# Patient Record
Sex: MALE | Race: WHITE | NOT HISPANIC OR LATINO | Employment: FULL TIME | URBAN - METROPOLITAN AREA
[De-identification: names, ages, dates, MRNs, and addresses within clinical notes are randomized per-mention and may not be internally consistent; named-entity substitution may affect disease eponyms.]

---

## 2019-07-14 ENCOUNTER — HOSPITAL ENCOUNTER (EMERGENCY)
Facility: HOSPITAL | Age: 39
Discharge: HOME | End: 2019-07-14
Attending: EMERGENCY MEDICINE | Admitting: EMERGENCY MEDICINE
Payer: COMMERCIAL

## 2019-07-14 ENCOUNTER — APPOINTMENT (EMERGENCY)
Dept: RADIOLOGY | Facility: HOSPITAL | Age: 39
End: 2019-07-14
Payer: COMMERCIAL

## 2019-07-14 VITALS
TEMPERATURE: 98.2 F | HEART RATE: 91 BPM | SYSTOLIC BLOOD PRESSURE: 119 MMHG | DIASTOLIC BLOOD PRESSURE: 54 MMHG | RESPIRATION RATE: 19 BRPM | OXYGEN SATURATION: 96 %

## 2019-07-14 DIAGNOSIS — S67.22XA: Primary | ICD-10-CM

## 2019-07-14 PROCEDURE — 99283 EMERGENCY DEPT VISIT LOW MDM: CPT

## 2019-07-14 PROCEDURE — 73140 X-RAY EXAM OF FINGER(S): CPT

## 2019-07-14 PROCEDURE — 99283 EMERGENCY DEPT VISIT LOW MDM: CPT | Performed by: EMERGENCY MEDICINE

## 2019-07-14 RX ORDER — LISINOPRIL 10 MG/1
10 TABLET ORAL DAILY
COMMUNITY
Start: 2018-09-04

## 2019-07-14 RX ORDER — BUPROPION HYDROCHLORIDE 150 MG/1
150 TABLET ORAL
COMMUNITY
Start: 2019-05-09 | End: 2020-05-08

## 2019-07-14 NOTE — ED PROVIDER NOTES
History  Chief Complaint   Patient presents with    Finger Laceration     to L thumb and injury to 4th L digit, onset today w/ a blade      43 yo male here for injury to left hand  Right hand dominant  It was briefly crushing by light force  Able to remove immediately  Small skin avulsion of left thumb  Crushing injury to left 4th digit  No numbness, tingling weakness  No other injuries reported  No prior injuries to this area  Does not take any anticoagulants or antiplatelets  He was worried about his wound because he is a diabetic  History provided by:  Patient   used: No    Finger Laceration   Location: left thumb  Length:  Skin avulsion  Depth:  Cutaneous  Quality: stellate    Bleeding: controlled    Time since incident:  3 hours  Laceration mechanism:  Metal edge  Pain details:     Quality:  Aching    Severity:  Mild    Timing:  Constant    Progression:  Unchanged  Foreign body present:  No foreign bodies  Relieved by:  Nothing  Worsened by:  Nothing  Ineffective treatments:  None tried  Tetanus status:  Up to date  Associated symptoms: no fever, no focal weakness, no numbness, no rash, no redness, no swelling and no streaking        Prior to Admission Medications   Prescriptions Last Dose Informant Patient Reported? Taking? SITagliptin-metFORMIN HCl ER (JANUMET XR) 100-1000 MG TB24   Yes Yes   Sig: TAKE 1 TABLET BY MOUTH EVERY DAY   Semaglutide (OZEMPIC) 0 25 or 0 5 MG/DOSE SOPN   Yes Yes   Sig: Inject 0 25 mg under the skin Once a week   buPROPion (WELLBUTRIN XL) 150 mg 24 hr tablet   Yes Yes   Sig: Take 150 mg by mouth   lisinopril (ZESTRIL) 10 mg tablet   Yes Yes   Sig: Take 10 mg by mouth daily      Facility-Administered Medications: None       Past Medical History:   Diagnosis Date    Diabetes mellitus ([facility] [address] )        History reviewed  No pertinent surgical history  History reviewed  No pertinent family history    I have reviewed and agree with the history as documented  Social History     Tobacco Use    Smoking status: Never Smoker    Smokeless tobacco: Former User   Substance Use Topics    Alcohol use: Not Currently    Drug use: Never        Review of Systems   Constitutional: Negative  Negative for fever  Respiratory: Negative for apnea, cough, choking, chest tightness, shortness of breath, wheezing and stridor  Genitourinary: Negative for decreased urine volume and enuresis  Musculoskeletal: Negative for back pain, myalgias, neck pain and neck stiffness  Skin: Positive for wound (left thumb)  Negative for pallor and rash  Allergic/Immunologic: Negative  Neurological: Negative for dizziness, focal weakness, syncope, speech difficulty, weakness, light-headedness, numbness and headaches  Physical Exam  Physical Exam   Constitutional: He is oriented to person, place, and time  He appears well-developed and well-nourished  Non-toxic appearance  He does not have a sickly appearance  He does not appear ill  No distress  HENT:   Head: Normocephalic and atraumatic  Eyes: Pupils are equal, round, and reactive to light  EOM and lids are normal    Neck: Normal range of motion  Neck supple  Cardiovascular: Normal rate, regular rhythm, S1 normal, S2 normal, normal heart sounds, intact distal pulses and normal pulses  Exam reveals no gallop, no distant heart sounds, no friction rub and no decreased pulses  No murmur heard  Pulses:       Radial pulses are 2+ on the right side, and 2+ on the left side  Pulmonary/Chest: Effort normal and breath sounds normal  No accessory muscle usage  No apnea, no tachypnea and no bradypnea  No respiratory distress  He has no decreased breath sounds  He has no wheezes  He has no rhonchi  He has no rales  Abdominal: Soft  Normal appearance  He exhibits no distension  There is no tenderness  There is no rigidity, no rebound and no guarding  Musculoskeletal: Normal range of motion   He exhibits no edema, tenderness or deformity  Normal radial ulnar and median nerve function of left hand  FROM left thumb and 4th digit  Small superficial skin avulsion of left thumb     Neurological: He is alert and oriented to person, place, and time  No cranial nerve deficit  GCS eye subscore is 4  GCS verbal subscore is 5  GCS motor subscore is 6  GCS 15  AAOx3  Ambulating in department without difficulty  CN II-XII grossly intact  No focal neuro deficits  Skin: Skin is warm, dry and intact  No rash noted  He is not diaphoretic  No erythema  No pallor  Psychiatric: His speech is normal    Nursing note and vitals reviewed  Vital Signs  ED Triage Vitals [07/14/19 1734]   Temperature Pulse Respirations Blood Pressure SpO2   98 2 °F (36 8 °C) 91 19 119/54 96 %      Temp Source Heart Rate Source Patient Position - Orthostatic VS BP Location FiO2 (%)   Oral -- Sitting Right arm --      Pain Score       6           Vitals:    07/14/19 1734   BP: 119/54   Pulse: 91   Patient Position - Orthostatic VS: Sitting         Visual Acuity      ED Medications  Medications - No data to display    Diagnostic Studies  Results Reviewed     None                 XR thumb 2 views LEFT   ED Interpretation by Abe Delacruz PA-C (07/14 1839)   No acute osseous abnormalities      Final Result by Sabiha Jorgensen MD (07/15 9141)      No acute osseous abnormality  Workstation performed: [id number]         XR finger second digit - index LEFT   ED Interpretation by Abe Delacruz PA-C (07/14 1839)   No acute osseous abnormalities      Final Result by Sabiha Jorgensen MD (07/15 9299)      No acute osseous abnormality              Workstation performed: [id number]                    Procedures  Procedures       ED Course                               MDM  Number of Diagnoses or Management Options  Crushing injury of left hand: new and requires workup  Diagnosis management comments: Differential diagnosis including but not limited to: sprain, strain, fracture, dislocation, contusion  Plan: skin avulsion superficial and does not require repair  No signs of infection at this point  Defer abx  Obtain XR r/o fx  dispo pending  Amount and/or Complexity of Data Reviewed  Tests in the radiology section of CPT®: ordered and reviewed  Independent visualization of images, tracings, or specimens: yes    Risk of Complications, Morbidity, and/or Mortality  Presenting problems: low  Management options: low  General comments: 45 yo with injury to hand  XR negative  Small skin avulsion is well appearing  No repair required  Recommended cleaning soap/water  Bacitracin as needed  Likely contusion injury  Return parameters provided  Pt understands and agrees with plan  Patient Progress  Patient progress: stable      Disposition  Final diagnoses:   Crushing injury of left hand     Time reflects when diagnosis was documented in both MDM as applicable and the Disposition within this note     Time User Action Codes Description Comment    7/14/2019  6:42 PM Gerard Blackwood Add [E39 [id number]] Crushing injury of left hand       ED Disposition     ED Disposition Condition Date/Time Comment    Discharge Stable Sun Jul 14, 2019  4488 Lyn Reina discharge to home/self care              Follow-up Information     Follow up With Specialties Details Why Contact Info    Rahul Ryan PA-C Physician Assistant Call  As needed [address] [place] [address]  [phone]            Discharge Medication List as of 7/14/2019  6:42 PM      CONTINUE these medications which have NOT CHANGED    Details   buPROPion (WELLBUTRIN XL) 150 mg 24 hr tablet Take 150 mg by mouth, Starting Thu 5/9/2019, Until Fri 5/8/2020, Historical Med      lisinopril (ZESTRIL) 10 mg tablet Take 10 mg by mouth daily, Starting Tue 9/4/2018, Historical Med      Semaglutide (OZEMPIC) 0 25 or 0 5 MG/DOSE SOPN Inject 0 25 mg under the skin Once a week, Starting Tue 12/18/2018, Historical Med      SITagliptin-metFORMIN HCl ER (JANUMET XR) 100-1000 MG TB24 TAKE 1 TABLET BY MOUTH EVERY DAY, Historical Med           No discharge procedures on file      ED Provider  Electronically Signed by           Abe Delacruz PA-C  07/25/19 1370

## 2021-02-26 ENCOUNTER — HOSPITAL ENCOUNTER (EMERGENCY)
Facility: HOSPITAL | Age: 41
Discharge: HOME | End: 2021-02-26
Attending: EMERGENCY MEDICINE | Admitting: EMERGENCY MEDICINE
Payer: COMMERCIAL

## 2021-02-26 VITALS
TEMPERATURE: 98.9 F | BODY MASS INDEX: 46.99 KG/M2 | HEART RATE: 77 BPM | SYSTOLIC BLOOD PRESSURE: 163 MMHG | WEIGHT: 300 LBS | OXYGEN SATURATION: 100 % | DIASTOLIC BLOOD PRESSURE: 81 MMHG | RESPIRATION RATE: 20 BRPM

## 2021-02-26 DIAGNOSIS — R43.2 LOSS OF TASTE: ICD-10-CM

## 2021-02-26 DIAGNOSIS — R50.9 FEVER: Primary | ICD-10-CM

## 2021-02-26 PROCEDURE — U0003 INFECTIOUS AGENT DETECTION BY NUCLEIC ACID (DNA OR RNA); SEVERE ACUTE RESPIRATORY SYNDROME CORONAVIRUS 2 (SARS-COV-2) (CORONAVIRUS DISEASE [COVID-19]), AMPLIFIED PROBE TECHNIQUE, MAKING USE OF HIGH THROUGHPUT TECHNOLOGIES AS DESCRIBED BY CMS-2020-01-R: HCPCS | Performed by: EMERGENCY MEDICINE

## 2021-02-26 PROCEDURE — U0005 INFEC AGEN DETEC AMPLI PROBE: HCPCS | Performed by: EMERGENCY MEDICINE

## 2021-02-26 PROCEDURE — 99283 EMERGENCY DEPT VISIT LOW MDM: CPT

## 2021-02-26 PROCEDURE — 99282 EMERGENCY DEPT VISIT SF MDM: CPT | Performed by: EMERGENCY MEDICINE

## 2021-02-26 NOTE — DISCHARGE INSTRUCTIONS
Please quadrant wound until results come back, if possible please discuss with the primary service as possible for possible antibiotic infusion    If you have trouble breathing return to ED for recheck

## 2021-02-26 NOTE — ED PROVIDER NOTES
History  Chief Complaint   Patient presents with    Flu Symptoms     Pt reports loss of taste and smell x 2 days, fevers and congestion  HPI  42-year-old presents fevers, congestion, loss taste and smell  States multiple people at work diagnosed with COVID-19  Nothing makes better or worse  Denies shortness of breath, chest pain  Has history of diabetes  Prior to Admission Medications   Prescriptions Last Dose Informant Patient Reported? Taking? SITagliptin-metFORMIN HCl ER (JANUMET XR) 100-1000 MG TB24   Yes No   Sig: TAKE 1 TABLET BY MOUTH EVERY DAY   Semaglutide (OZEMPIC) 0 25 or 0 5 MG/DOSE SOPN   Yes No   Sig: Inject 0 25 mg under the skin Once a week   buPROPion (WELLBUTRIN XL) 150 mg 24 hr tablet   Yes No   Sig: Take 150 mg by mouth   lisinopril (ZESTRIL) 10 mg tablet   Yes No   Sig: Take 10 mg by mouth daily      Facility-Administered Medications: None       Past Medical History:   Diagnosis Date    Diabetes mellitus ([facility] [address] )        History reviewed  No pertinent surgical history  History reviewed  No pertinent family history  I have reviewed and agree with the history as documented  E-Cigarette/Vaping     E-Cigarette/Vaping Substances     Social History     Tobacco Use    Smoking status: Never Smoker    Smokeless tobacco: Former User   Substance Use Topics    Alcohol use: Not Currently    Drug use: Never       Review of Systems   Constitutional: Positive for fever  Negative for chills  HENT: Positive for congestion  Negative for dental problem and ear pain          +loss of taste and smell   Eyes: Negative for pain and redness  Respiratory: Negative for cough and shortness of breath  Cardiovascular: Negative for chest pain and palpitations  Gastrointestinal: Negative for abdominal pain and nausea  Endocrine: Negative for polydipsia and polyphagia  Genitourinary: Negative for dysuria and frequency  Musculoskeletal: Negative for arthralgias and joint swelling     Skin: Negative for color change and rash  Neurological: Negative for dizziness and headaches  Psychiatric/Behavioral: Negative for behavioral problems and confusion  All other systems reviewed and are negative  Physical Exam  Physical Exam  Vitals signs and nursing note reviewed  Constitutional:       General: He is not in acute distress  HENT:      Head: Normocephalic and atraumatic  Right Ear: External ear normal       Left Ear: External ear normal       Nose: Nose normal    Eyes:      General: No scleral icterus  Cardiovascular:      Rate and Rhythm: Normal rate  Pulmonary:      Effort: Pulmonary effort is normal  No respiratory distress  Abdominal:      General: There is no distension  Musculoskeletal: Normal range of motion  General: No deformity  Skin:     Findings: No rash  Neurological:      General: No focal deficit present  Mental Status: He is alert  Gait: Gait normal    Psychiatric:         Mood and Affect: Mood normal          Vital Signs  ED Triage Vitals [02/26/21 1610]   Temperature Pulse Respirations Blood Pressure SpO2   98 9 °F (37 2 °C) 77 20 163/81 100 %      Temp Source Heart Rate Source Patient Position - Orthostatic VS BP Location FiO2 (%)   Oral Monitor Sitting Left arm --      Pain Score       --           Vitals:    02/26/21 1610   BP: 163/81   Pulse: 77   Patient Position - Orthostatic VS: Sitting         Visual Acuity      ED Medications  Medications - No data to display    Diagnostic Studies  Results Reviewed     Procedure Component Value Units Date/Time    Novel Coronavirus [facility] [[id number]] Collected: 02/26/21 1705    Lab Status:  In process Specimen: Nares from Nasopharyngeal Swab Updated: 02/26/21 1709                 No orders to display              Procedures  Procedures         ED Course                             SBIRT 22yo+      Most Recent Value   SBIRT (25 yo +)   In order to provide better care to our patients, we are screening all of our patients for alcohol and drug use  Would it be okay to ask you these screening questions? Yes Filed at: 02/26/2021 1718   Initial Alcohol Screen: US AUDIT-C    1  How often do you have a drink containing alcohol?  0 Filed at: 02/26/2021 1718   2  How many drinks containing alcohol do you have on a typical day you are drinking? 0 Filed at: 02/26/2021 1718   3b  FEMALE Any Age, or MALE 65+: How often do you have 4 or more drinks on one occassion? 0 Filed at: 02/26/2021 1718   Audit-C Score  0 Filed at: 02/26/2021 1718   ADEN: How many times in the past year have you    Used an illegal drug or used a prescription medication for non-medical reasons? Never Filed at: 02/26/2021 1718                    MDM  Discussed likely COVID, test ordered, discussed quarantine, he appears well, no hypoxia  No chest pain or shortness of breath  no further testing indicated at this time  Discuss interbody fusion not available in the ED however follow-up with primary care if he is positive for consideration  Disposition  Final diagnoses:   Fever   Loss of taste     Time reflects when diagnosis was documented in both MDM as applicable and the Disposition within this note     Time User Action Codes Description Comment    2/26/2021  5:03 PM Ronaldo Zamarripa [U07 1] COVID-19     2/26/2021  5:03 PM Deshawn Los Remove [U07 1] COVID-19     2/26/2021  5:03 PM Ronaldo Zamarripa [R50 9] Fever     2/26/2021  5:03 PM Deshawn Los Add [R43 2] Loss of taste       ED Disposition     ED Disposition Condition Date/Time Comment    Discharge Stable Fri Feb 26, 2021  5:03 PM Nikki Oar discharge to home/self care              Follow-up Information     Follow up With Specialties Details Why Contact Info    Matthew Alston PA-C Physician Assistant Call   [address] [place] [address]  [phone]            Discharge Medication List as of 2/26/2021  5:04 PM      CONTINUE these medications which have NOT CHANGED    Details   buPROPion (WELLBUTRIN XL) 150 mg 24 hr tablet Take 150 mg by mouth, Starting Thu 5/9/2019, Until Fri 5/8/2020, Historical Med      lisinopril (ZESTRIL) 10 mg tablet Take 10 mg by mouth daily, Starting Tue 9/4/2018, Historical Med      Semaglutide (OZEMPIC) 0 25 or 0 5 MG/DOSE SOPN Inject 0 25 mg under the skin Once a week, Starting Tue 12/18/2018, Historical Med      SITagliptin-metFORMIN HCl ER (JANUMET XR) 100-1000 MG TB24 TAKE 1 TABLET BY MOUTH EVERY DAY, Historical Med           No discharge procedures on file      PDMP Review     None          ED Provider  Electronically Signed by           Tonie Thomas MD  02/26/21 5446

## 2021-02-27 ENCOUNTER — TELEPHONE (OUTPATIENT)
Dept: OTHER | Facility: OTHER | Age: 41
End: 2021-02-27

## 2021-02-27 LAB — SARS-COV-2 RNA RESP QL NAA+PROBE: POSITIVE

## 2021-02-27 NOTE — TELEPHONE ENCOUNTER
Your test for the novel coronavirus, also known as COVID-19, was positive  The sample showed that the virus was present  Positive COVID-19 test results are reportable to the [place] Department of Health  You may receive a call from trained public health staff to conduct an interview  It is important to answer their call  They will ask you to verify who you are  During the call they will ask you about what symptoms you have, what you did before you got sick, and who you were close to while sick  The health department does this to make sure everyone stays healthy and to reduce the spread of the virus  If you would like to verify if the caller does in fact work in contact tracing, call the [address] [place] [address] at [employer] ([phone])  For additional information, please visit the [place]  website: www health [email or web address] gov     If you have any additional questions, we can schedule a virtual visit for you with a provider or call the [facility] [phone], option 7, for care advice    For additional information, please visit the Coronavirus FAQ on the [facility] home page ([facility]  org)

## 2023-04-25 ENCOUNTER — OFFICE VISIT (OUTPATIENT)
Dept: PHYSICAL THERAPY | Facility: CLINIC | Age: 43
End: 2023-04-25

## 2023-04-25 DIAGNOSIS — G57.01 PIRIFORMIS SYNDROME OF RIGHT SIDE: ICD-10-CM

## 2023-04-25 DIAGNOSIS — S39.012A LUMBAR STRAIN, INITIAL ENCOUNTER: Primary | ICD-10-CM

## 2023-04-25 DIAGNOSIS — M54.16 RADICULOPATHY, LUMBAR REGION: ICD-10-CM

## 2023-04-25 DIAGNOSIS — M62.9 HAMSTRING TIGHTNESS OF BOTH LOWER EXTREMITIES: ICD-10-CM

## 2023-04-25 NOTE — PROGRESS NOTES
"Daily Note     Today's date: 2023  Patient name: Scooby Keller  : 1980  MRN: [id number]  Referring provider: [facility]*  Dx:   Encounter Diagnosis     ICD-10-CM    1  Lumbar strain, initial encounter  S39 012A       2  Radiculopathy, lumbar region  M54 16       3  Hamstring tightness of both lower extremities  M62 9       4  Piriformis syndrome of right side  G57 01           Start Time: 1757  Stop Time: 184  Total time in clinic (min): 44 minutes    Subjective: patient reports no change in status since IE  Reports LB pain with standing  Reports compliance with HEP offering temporary relief  Objective: See treatment diary below      Assessment:  Patient was able to complete therapy with min reported onset of LB discomfort that did not linger  Reports tightness post therapy  Was able to introduce hip and LB muscularity strengthening and TA muscularity re-ed  Required cueing throughout on sequencing of exercises along with positioning and tactile to ensure corrrect muscularity recruitment  Plan: Continue per plan of care  Progress treatment as tolerated         Daily Treatment Diary    EPOC: 23  Precautions: HTN- takes meds; DMII  Co- Morbidities: HTN- takes meds; DMII    Manuals           Consider p/a mobs to l/s            TPR to R lumbosacral area/glute med/max            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                       There Ex    Pt ed 8' HEP instruct/handout           Recumbent bike  L1 6 min          DKTC  15\"x5                      Hip ER stretch  30\"x3 BL          Piriformis Stretch  30\"x3 BL                                  Neuro-Re-ed    DLS: isometric abdominals- TA  10\"x10          DLS: SLR w/TA contraction  x10 ea                      DLS: fall outs w/ TA contraction  3\"x 10 ea          DLS: marches w/ TA contraction  3\"x 20 ea          DLS: S/l hip abduction  x10 ea          DLS: yao [place] walk outs fwd          " Viet walk outs lat            TB resisted sidestepping            TB resisted hip strengthening                        Ther Activity    STS                        Gait Training                            Modalities    CP PRN Home if needed

## 2023-05-01 ENCOUNTER — APPOINTMENT (OUTPATIENT)
Dept: PHYSICAL THERAPY | Facility: CLINIC | Age: 43
End: 2023-05-01
Payer: COMMERCIAL

## 2023-05-04 ENCOUNTER — OFFICE VISIT (OUTPATIENT)
Dept: PHYSICAL THERAPY | Facility: CLINIC | Age: 43
End: 2023-05-04

## 2023-05-04 DIAGNOSIS — R19.8 ABDOMINAL WEAKNESS: ICD-10-CM

## 2023-05-04 DIAGNOSIS — M62.9 HAMSTRING TIGHTNESS OF BOTH LOWER EXTREMITIES: ICD-10-CM

## 2023-05-04 DIAGNOSIS — M54.16 RADICULOPATHY, LUMBAR REGION: ICD-10-CM

## 2023-05-04 DIAGNOSIS — S39.012A LUMBAR STRAIN, INITIAL ENCOUNTER: Primary | ICD-10-CM

## 2023-05-04 DIAGNOSIS — R29.898 WEAKNESS OF BOTH HIPS: ICD-10-CM

## 2023-05-04 DIAGNOSIS — M47.816 LUMBAR FACET ARTHROPATHY: ICD-10-CM

## 2023-05-04 DIAGNOSIS — G57.01 PIRIFORMIS SYNDROME OF RIGHT SIDE: ICD-10-CM

## 2023-05-04 DIAGNOSIS — M51.36 DEGENERATIVE DISC DISEASE, LUMBAR: ICD-10-CM

## 2023-05-04 NOTE — PROGRESS NOTES
"Daily Note     Today's date: 2023  Patient name: Darnell Hernandes  : 1980  MRN: [id number]  Referring provider: [place] [facility]*  Dx:   Encounter Diagnosis     ICD-10-CM    1  Lumbar strain, initial encounter  S39 012A       2  Radiculopathy, lumbar region  M54 16       3  Degenerative disc disease, lumbar  M51 36       4  Lumbar facet arthropathy  M47 816       5  Hamstring tightness of both lower extremities  M62 9       6  Weakness of both hips  R29 898       7  Abdominal weakness  R19 8       8  Piriformis syndrome of right side  G57 01           Start Time: 1800          Subjective: pt reports his back feels about the same  Objective: See treatment diary below      Assessment: Tolerated treatment well  Patient would benefit from continued PT  Progressed L/S mobility exercises w/ good tolerance  Tighter on R side vs L side  Added LAD to R LE to decrease current sxs  Plan: Continue per plan of care  Progress treatment as tolerated         Daily Treatment Diary    EPOC: 23  Precautions: HTN- takes meds; DMII  Co- Morbidities: HTN- takes meds; DMII    Manuals          Consider p/a mobs to l/s            TPR to R lumbosacral area/glute med/max            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           R LAD   [facility] [place]         There Ex    Pt ed 8' HEP instruct/handout           Recumbent bike  L1 6 min L1 6 min         DKTC  15\"x5 15\"x5         LTR   10\" 10x         Hip ER stretch  30\"x3 BL 30\"x3 BL         Piriformis Stretch  30\"x3 BL 30\"x3 BL                                 Neuro-Re-ed    DLS: isometric abdominals- TA  10\"x10 10\"x10         DLS: SLR w/TA contraction  x10 ea X 10 ea                     DLS: fall outs w/ TA contraction  3\"x 10 ea 3\"x 10 ea         DLS: marches w/ TA contraction  3\"x 20 ea 3\"x 20 ea         DLS: S/l hip abduction  x10 ea x10 ea         DLS: clamshells   5\" 10x         Viet walk outs fwd            Viet walk outs lat            TB " resisted sidestepping            TB resisted hip strengthening                        Ther Activity    STS                        Gait Training                            Modalities    CP PRN Home if needed

## 2023-05-08 ENCOUNTER — OFFICE VISIT (OUTPATIENT)
Dept: PHYSICAL THERAPY | Facility: CLINIC | Age: 43
End: 2023-05-08

## 2023-05-08 DIAGNOSIS — M51.36 DEGENERATIVE DISC DISEASE, LUMBAR: ICD-10-CM

## 2023-05-08 DIAGNOSIS — M62.9 HAMSTRING TIGHTNESS OF BOTH LOWER EXTREMITIES: Primary | ICD-10-CM

## 2023-05-08 DIAGNOSIS — S39.012A LUMBAR STRAIN, INITIAL ENCOUNTER: ICD-10-CM

## 2023-05-08 DIAGNOSIS — R29.898 WEAKNESS OF BOTH HIPS: ICD-10-CM

## 2023-05-08 DIAGNOSIS — M54.16 RADICULOPATHY, LUMBAR REGION: ICD-10-CM

## 2023-05-08 NOTE — PROGRESS NOTES
"Daily Note     Today's date: 2023  Patient name: Anibal Caballero  : 1980  MRN: [id number]  Referring provider: Yonis Araujo  Dx:   Encounter Diagnosis     ICD-10-CM    1  Hamstring tightness of both lower extremities  M62 9       2  Lumbar strain, initial encounter  S39 012A       3  Radiculopathy, lumbar region  M54 16       4  Weakness of both hips  R29 898       5  Degenerative disc disease, lumbar  M51 36           Start Time: 175  Stop Time:   Total time in clinic (min): 43 minutes    Subjective: patient reports having R sided LB pain since yesterday afternoon  Reports increased activity over weekend  Reports playing with kids fishing and doing yard work  Reports laying down on floor and elevating legs after yard work  Reports pain since  Reports weekness of LE  Objective: See treatment diary below      Assessment:  Patient reports improved symptoms post therapy when compared beginning  Education on use of lumbar roll provided  Performed gentle stretch and muscle setting exercises  Plan: Continue per plan of care  Progress treatment as tolerated         Daily Treatment Diary    EPOC: 23  Precautions: HTN- takes meds; DMII  Co- Morbidities: HTN- takes meds; DMII    Manuals        Consider p/a mobs to l/s           TPR to R lumbosacral area/glute med/max           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          [facility]        There Ex    Pt ed 8' HEP instruct/handout   AMRITA on Lumbar roll use        Recumbent bike  L1 6 min L1 6 min L1 x 6 mins w/lumbar roll       DKTC  15\"x5 15\"x5 15\"x5       LTR   10\" 10x 10\"x10       Hip ER stretch  30\"x3 BL 30\"x3 BL 30\"x3 BL        Piriformis Stretch  30\"x3 BL 30\"x3 BL 30\"3 BL                             Neuro-Re-ed    DLS: isometric abdominals- TA  10\"x10 10\"x10 10\"x10       DLS: SLR w/TA contraction  x10 ea X 10 ea                   DLS: fall outs w/ TA contraction  3\"x 10 ea 3\"x 10 ea 3\"x10 ea       DLS: " "buddy w/ TA contraction  3\"x 20 ea 3\"x 20 ea 3\"x20 ea        DLS: S/l hip abduction  x10 ea x10 ea        DLS: clamshells   5\" 10x        [place] walk outs fwd           Viet walk outs lat           TB resisted sidestepping           TB resisted hip strengthening                      Ther Activity    STS                      Gait Training                          Modalities    CP PRN Home if needed                                     "

## 2023-05-10 NOTE — PROGRESS NOTES
"Daily Note     Today's date: 5/10/2023  Patient name: Aylin Cullen  : 1980  MRN: [id number]  Referring provider: Tushar Romeo  Dx: No diagnosis found  Subjective: pt reports      Objective: See treatment diary below      Assessment: Tolerated treatment well  Patient would benefit from continued PT  Plan: Continue per plan of care        Daily Treatment Diary    EPOC: 23  Precautions: HTN- takes meds; DMII  Co- Morbidities: HTN- takes meds; DMII    Manuals       Consider p/a mobs to l/s           TPR to R lumbosacral area/glute med/max           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          R [facility]        There Ex    Pt ed 8' HEP instruct/handout   AMRITA on Lumbar roll use        Recumbent bike  L1 6 min L1 6 min L1 x 6 mins w/lumbar roll       DKTC  15\"x5 15\"x5 15\"x5       LTR   10\" 10x 10\"x10       Hip ER stretch  30\"x3 BL 30\"x3 BL 30\"x3 BL        Piriformis Stretch  30\"x3 BL 30\"x3 BL 30\"3 BL                             Neuro-Re-ed    DLS: isometric abdominals- TA  10\"x10 10\"x10 10\"x10       DLS: SLR w/TA contraction  x10 ea X 10 ea                   DLS: fall outs w/ TA contraction  3\"x 10 ea 3\"x 10 ea 3\"x10 ea       DLS: marches w/ TA contraction  3\"x 20 ea 3\"x 20 ea 3\"x20 ea        DLS: S/l hip abduction  x10 ea x10 ea        DLS: clamshells   5\" 10x        Viet walk outs fwd           Viet walk outs lat           TB resisted sidestepping           TB resisted hip strengthening                      Ther Activity    STS                      Gait Training                          Modalities    CP PRN Home if needed                                       "

## 2023-05-11 ENCOUNTER — APPOINTMENT (OUTPATIENT)
Dept: PHYSICAL THERAPY | Facility: CLINIC | Age: 43
End: 2023-05-11
Payer: COMMERCIAL

## 2023-05-15 ENCOUNTER — APPOINTMENT (OUTPATIENT)
Dept: PHYSICAL THERAPY | Facility: CLINIC | Age: 43
End: 2023-05-15
Payer: COMMERCIAL

## 2023-05-18 ENCOUNTER — APPOINTMENT (OUTPATIENT)
Dept: PHYSICAL THERAPY | Facility: CLINIC | Age: 43
End: 2023-05-18
Payer: COMMERCIAL

## 2023-05-18 NOTE — PROGRESS NOTES
"Daily Note     Today's date: 2023  Patient name: Madison Marinelli  : 1980  MRN: [id number]  Referring provider: Barry Mejía  Dx: No diagnosis found  Subjective: pt reports      Objective: See treatment diary below      Assessment: Tolerated treatment well  Patient would benefit from continued PT  Plan: Continue per plan of care        Daily Treatment Diary    EPOC: 23  Precautions: HTN- takes meds; DMII  Co- Morbidities: HTN- takes meds; DMII    Manuals       Consider p/a mobs to l/s           TPR to R lumbosacral area/glute med/max           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          R [facility]        There Ex    Pt ed 8' HEP instruct/handout   AMRITA on Lumbar roll use        Recumbent bike  L1 6 min L1 6 min L1 x 6 mins w/lumbar roll       DKTC  15\"x5 15\"x5 15\"x5       LTR   10\" 10x 10\"x10       Hip ER stretch  30\"x3 BL 30\"x3 BL 30\"x3 BL        Piriformis Stretch  30\"x3 BL 30\"x3 BL 30\"3 BL                             Neuro-Re-ed    DLS: isometric abdominals- TA  10\"x10 10\"x10 10\"x10       DLS: SLR w/TA contraction  x10 ea X 10 ea                   DLS: fall outs w/ TA contraction  3\"x 10 ea 3\"x 10 ea 3\"x10 ea       DLS: marches w/ TA contraction  3\"x 20 ea 3\"x 20 ea 3\"x20 ea        DLS: S/l hip abduction  x10 ea x10 ea        DLS: clamshells   5\" 10x        Viet walk outs fwd           [place] walk outs lat           TB resisted sidestepping           TB resisted hip strengthening                      Ther Activity    STS                      Gait Training                          Modalities    CP PRN Home if needed                                         "

## 2023-05-22 ENCOUNTER — OFFICE VISIT (OUTPATIENT)
Dept: PHYSICAL THERAPY | Facility: CLINIC | Age: 43
End: 2023-05-22

## 2023-05-22 DIAGNOSIS — S39.012A LUMBAR STRAIN, INITIAL ENCOUNTER: ICD-10-CM

## 2023-05-22 DIAGNOSIS — M51.36 DEGENERATIVE DISC DISEASE, LUMBAR: ICD-10-CM

## 2023-05-22 DIAGNOSIS — M62.9 HAMSTRING TIGHTNESS OF BOTH LOWER EXTREMITIES: Primary | ICD-10-CM

## 2023-05-22 DIAGNOSIS — M47.816 LUMBAR FACET ARTHROPATHY: ICD-10-CM

## 2023-05-22 NOTE — PROGRESS NOTES
"Daily Note     Today's date: 2023  Patient name: Lazarus Parma  : 1980  MRN: [id number]  Referring provider: Sulema Plaza  Dx:   Encounter Diagnosis     ICD-10-CM    1  Hamstring tightness of both lower extremities  M62 9       2  Degenerative disc disease, lumbar  M51 36       3  Lumbar strain, initial encounter  S39 012A       4  Lumbar facet arthropathy  M47 816                      Subjective: Pt reports that he has been noticing less overall pain recently- adds that he has not had any pain today  Objective: See treatment diary below      Assessment:  Pt tolerated all tx session well until attempted TB resisted hip flexion and L extension- stopped these activities and had pt perform repeated lumbar extensions which improved symptoms  Pt instructed to perform at home  Plan: Continue per plan of care  Progress treatment as tolerated         Daily Treatment Diary    EPOC: 23  Precautions: HTN- takes meds; DMII  Co- Morbidities: HTN- takes meds; DMII    Manuals       Consider p/a mobs to l/s           TPR to R lumbosacral area/glute med/max           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          R [facility]        There Ex    Pt ed 8' HEP instruct/handout   AMRITA on Lumbar roll use        Recumbent bike  L1 6 min L1 6 min L1 x 6 mins w/lumbar roll 6'       DKTC  15\"x5 15\"x5 15\"x5 15\"  x5 w/ pball      LTR   10\" 10x 10\"x10 10\"x10 ea b/l       Hip ER stretch  30\"x3 BL 30\"x3 BL 30\"x3 BL  30\"x3 b/l       Piriformis Stretch  30\"x3 BL 30\"x3 BL 30\"3 BL 30\"x3 b/l       Standing lumbar extensions     2x10                 Neuro-Re-ed    DLS: isometric abdominals- TA  10\"x10 10\"x10 10\"x10 T/o TE      DLS: SLR w/TA contraction  x10 ea X 10 ea                   DLS: fall outs w/ TA contraction  3\"x 10 ea 3\"x 10 ea 3\"x10 ea       DLS: marches w/ TA contraction  3\"x 20 ea 3\"x 20 ea 3\"x20 ea        DLS: S/l hip abduction  x10 ea x10 ea        DLS: clamshells   5\" 10x      " Viet walk outs fwd           [place] walk outs lat           TB resisted sidestepping           TB resisted hip strengthening     YTB 15x ea b/l (abd - held flex and L ext due to pain)                 Ther Activity    STS     15x low mat                 Gait Training                          Modalities    CP PRN Home if needed

## 2023-05-25 ENCOUNTER — APPOINTMENT (OUTPATIENT)
Dept: PHYSICAL THERAPY | Facility: CLINIC | Age: 43
End: 2023-05-25
Payer: COMMERCIAL

## 2023-05-31 ENCOUNTER — APPOINTMENT (OUTPATIENT)
Dept: PHYSICAL THERAPY | Facility: CLINIC | Age: 43
End: 2023-05-31
Payer: COMMERCIAL

## 2023-10-16 ENCOUNTER — OFFICE VISIT (OUTPATIENT)
Dept: OBGYN CLINIC | Facility: CLINIC | Age: 43
End: 2023-10-16
Payer: COMMERCIAL

## 2023-10-16 ENCOUNTER — TELEPHONE (OUTPATIENT)
Dept: OBGYN CLINIC | Facility: CLINIC | Age: 43
End: 2023-10-16

## 2023-10-16 ENCOUNTER — APPOINTMENT (OUTPATIENT)
Dept: RADIOLOGY | Facility: CLINIC | Age: 43
End: 2023-10-16
Payer: COMMERCIAL

## 2023-10-16 VITALS
DIASTOLIC BLOOD PRESSURE: 80 MMHG | SYSTOLIC BLOOD PRESSURE: 124 MMHG | WEIGHT: 298 LBS | HEIGHT: 67 IN | BODY MASS INDEX: 46.77 KG/M2 | HEART RATE: 82 BPM

## 2023-10-16 DIAGNOSIS — M25.551 PAIN IN RIGHT HIP: ICD-10-CM

## 2023-10-16 DIAGNOSIS — M54.16 RADICULOPATHY, LUMBAR REGION: ICD-10-CM

## 2023-10-16 DIAGNOSIS — M54.16 RADICULOPATHY, LUMBAR REGION: Primary | ICD-10-CM

## 2023-10-16 PROCEDURE — 99213 OFFICE O/P EST LOW 20 MIN: CPT | Performed by: FAMILY MEDICINE

## 2023-10-16 PROCEDURE — 73502 X-RAY EXAM HIP UNI 2-3 VIEWS: CPT

## 2023-10-16 NOTE — PROGRESS NOTES
Assessment/Plan:  Assessment/Plan   Diagnoses and all orders for this visit:    Radiculopathy, lumbar region  -     MRI lumbar spine wo contrast; Future    Pain in right hip  -     MRI hip right wo contrast; Future  -     XR hip/pelv 2-3 vws right if performed; Future            54-year-old male with low back pain and right lower extremity pain and numbness of more than 1 year duration. Discussed with patient physical exam, radiographs, impression, and plan. X-rays hip and pelvis unremarkable for acute osseous abnormality, with small calcifications at the greater jugular. X-rays lumbar spine noted for disc space narrowing L5-S1. Physical exam lumbar spine noted for bilateral paraspinal tenderness. He has limited range of motion with rotating to both sides. He has weakness both lower extremities with hip flexion at 4+/5. There is no groin pain with EBONY and FADDIR of the hips. He has tenderness on the right at the piriformis and gluteus medius. Straight leg raise is unremarkable bilaterally. He has normal sensation both lower extremities. Clinical impression is that he may have symptoms of combination of lumbar radiculopathy and right hip pathology. Symptoms persist despite conservative management of meloxicam 15 mg once daily since 4/18/2023, formal therapy 4/19/2023 through 5/22/2023, chiropractic once weekly since 5/22/2023. At this time I will refer him for MRI lumbar spine to evaluate for disc pathology, stenosis, and nerve impingement as more invasive management may be warranted. I will refer him for MRI right hip to evaluate for stress fracture and sciatic nerve irregularity as more invasive management may be warranted. He will return after having MRIs done. Subjective:   Patient ID: Eleanor Iverson is a 37 y.o. male.   Chief Complaint   Patient presents with    Spine - Follow-up        54-year-old male following up for low back pain and right lower extremity pain and numbness more than 1 year duration. He was last seen by me 6 months ago at which point he was referred to formal therapy and prescribed meloxicam 15 mg once daily. He reports worsening of symptoms since last visit. He attended formal therapy 04/19/2023 through 5/22/2023. Since then he has continued with home exercises and he also started seeing chiropractor around 5/22/2023. He has been seeing chiropractor once weekly and also having acupuncture done. He reports having pain described localized to the low back but worse on the right, radiating to the right buttock and distally to the right lower leg, worse with prolonged standing and ambulation, associated with numbness and tingling in the right lower extremity, and improved with resting. He denies bowel or bladder incontinence. Back Pain  This is a chronic problem. The current episode started more than 1 year ago. The problem occurs daily. The problem has been gradually worsening. Associated symptoms include arthralgias and numbness. Pertinent negatives include no joint swelling or weakness. The symptoms are aggravated by standing and walking. He has tried rest, position changes and NSAIDs (Physical therapy, home exercise, chiropractic, acupuncture) for the symptoms. The treatment provided mild relief. Hip Pain  This is a chronic problem. The current episode started more than 1 year ago. The problem occurs daily. The problem has been gradually worsening. Associated symptoms include arthralgias and numbness. Pertinent negatives include no joint swelling or weakness. The symptoms are aggravated by standing and walking. He has tried rest, position changes and NSAIDs (Physical therapy, home exercise, chiropractic, acupuncture) for the symptoms. The treatment provided mild relief. Review of Systems   Musculoskeletal:  Positive for arthralgias and back pain. Negative for joint swelling. Neurological:  Positive for numbness. Negative for weakness. Objective:  Right Ankle Exam     Muscle Strength   Dorsiflexion:  5/5  Plantar flexion:  5/5       Left Ankle Exam     Muscle Strength   Dorsiflexion:  5/5   Plantar flexion:  5/5       Right Hip Exam     Tenderness   Right hip tenderness location: Gluteus medius, piriformis. Muscle Strength   Flexion: 5/5     Tests   EBONY: negative    Comments:  Negative FADDIR      Left Hip Exam     Muscle Strength   Flexion: 5/5     Tests   EBONY: negative      Back Exam     Tenderness   The patient is experiencing tenderness in the lumbar. Range of Motion   Extension:  normal   Flexion:  normal   Lateral bend right:  normal   Lateral bend left:  normal   Rotation right:  abnormal   Rotation left:  abnormal     Muscle Strength   Right Quadriceps:  5/5   Left Quadriceps:  5/5     Tests   Straight leg raise right: negative  Straight leg raise left: negative    Other   Sensation: normal          Strength/Myotome Testing     Left Ankle/Foot   Dorsiflexion: 5  Plantar flexion: 5    Right Ankle/Foot   Dorsiflexion: 5  Plantar flexion: 5      Physical Exam  Vitals and nursing note reviewed. Constitutional:       Appearance: Normal appearance. He is well-developed. He is not ill-appearing or diaphoretic. HENT:      Head: Normocephalic and atraumatic. Right Ear: External ear normal.      Left Ear: External ear normal.   Eyes:      Conjunctiva/sclera: Conjunctivae normal.   Neck:      Trachea: No tracheal deviation. Cardiovascular:      Rate and Rhythm: Normal rate. Pulmonary:      Effort: Pulmonary effort is normal. No respiratory distress. Abdominal:      General: There is no distension. Musculoskeletal:         General: Tenderness present. No swelling, deformity or signs of injury. Lumbar back: Negative right straight leg raise test and negative left straight leg raise test.   Skin:     General: Skin is warm and dry. Coloration: Skin is not jaundiced or pale.    Neurological:      Mental Status: He is alert and oriented to person, place, and time. Psychiatric:         Mood and Affect: Mood normal.         Behavior: Behavior normal.         Thought Content: Thought content normal.         Judgment: Judgment normal.       I have personally reviewed pertinent films in PACS and my interpretation is  . X-rays hip and pelvis unremarkable for acute osseous abnormality, with small calcifications at the greater jugular. X-rays lumbar spine noted for disc space narrowing L5-S1.

## 2023-10-26 ENCOUNTER — HOSPITAL ENCOUNTER (OUTPATIENT)
Dept: MRI IMAGING | Facility: CLINIC | Age: 43
Discharge: HOME | End: 2023-10-26
Payer: COMMERCIAL

## 2023-10-26 DIAGNOSIS — M54.16 RADICULOPATHY, LUMBAR REGION: ICD-10-CM

## 2023-10-26 DIAGNOSIS — M25.551 PAIN IN RIGHT HIP: ICD-10-CM

## 2023-10-26 PROCEDURE — 72148 MRI LUMBAR SPINE W/O DYE: CPT

## 2023-10-26 PROCEDURE — G1004 CDSM NDSC: HCPCS

## 2023-10-29 ENCOUNTER — HOSPITAL ENCOUNTER (OUTPATIENT)
Dept: MRI IMAGING | Facility: HOSPITAL | Age: 43
Discharge: HOME | End: 2023-10-29
Attending: FAMILY MEDICINE
Payer: COMMERCIAL

## 2023-10-29 PROCEDURE — G1004 CDSM NDSC: HCPCS

## 2023-10-29 PROCEDURE — 73721 MRI JNT OF LWR EXTRE W/O DYE: CPT

## 2023-11-03 VITALS
SYSTOLIC BLOOD PRESSURE: 130 MMHG | HEART RATE: 86 BPM | HEIGHT: 67 IN | DIASTOLIC BLOOD PRESSURE: 79 MMHG | WEIGHT: 298 LBS | BODY MASS INDEX: 46.77 KG/M2

## 2023-11-03 DIAGNOSIS — M76.891 TENDINITIS OF RIGHT HIP: ICD-10-CM

## 2023-11-03 DIAGNOSIS — S73.191D TEAR OF RIGHT ACETABULAR LABRUM, SUBSEQUENT ENCOUNTER: ICD-10-CM

## 2023-11-03 DIAGNOSIS — M54.16 RADICULOPATHY, LUMBAR REGION: Primary | ICD-10-CM

## 2023-11-03 DIAGNOSIS — M47.816 LUMBAR FACET ARTHROPATHY: ICD-10-CM

## 2023-11-03 DIAGNOSIS — M48.061 LUMBAR FORAMINAL STENOSIS: ICD-10-CM

## 2023-11-03 PROCEDURE — 20610 DRAIN/INJ JOINT/BURSA W/O US: CPT | Performed by: FAMILY MEDICINE

## 2023-11-03 PROCEDURE — 99214 OFFICE O/P EST MOD 30 MIN: CPT | Performed by: FAMILY MEDICINE

## 2023-11-03 RX ORDER — BUPIVACAINE HYDROCHLORIDE 2.5 MG/ML
4 INJECTION, SOLUTION INFILTRATION; PERINEURAL
Status: COMPLETED | OUTPATIENT
Start: 2023-11-03 | End: 2023-11-03

## 2023-11-03 RX ORDER — SEMAGLUTIDE 1.34 MG/ML
INJECTION, SOLUTION SUBCUTANEOUS
COMMUNITY
Start: 2023-10-17

## 2023-11-03 RX ORDER — BUPIVACAINE HYDROCHLORIDE 2.5 MG/ML
1 INJECTION, SOLUTION INFILTRATION; PERINEURAL
Status: COMPLETED | OUTPATIENT
Start: 2023-11-03 | End: 2023-11-03

## 2023-11-03 RX ORDER — TRIAMCINOLONE ACETONIDE 40 MG/ML
40 INJECTION, SUSPENSION INTRA-ARTICULAR; INTRAMUSCULAR
Status: COMPLETED | OUTPATIENT
Start: 2023-11-03 | End: 2023-11-03

## 2023-11-03 RX ORDER — HYDROCODONE BITARTRATE AND ACETAMINOPHEN 5; 325 MG/1; MG/1
TABLET ORAL
COMMUNITY

## 2023-11-03 RX ADMIN — TRIAMCINOLONE ACETONIDE 40 MG: 40 INJECTION, SUSPENSION INTRA-ARTICULAR; INTRAMUSCULAR at 11:15

## 2023-11-03 RX ADMIN — BUPIVACAINE HYDROCHLORIDE 1 ML: 2.5 INJECTION, SOLUTION INFILTRATION; PERINEURAL at 11:15

## 2023-11-03 RX ADMIN — BUPIVACAINE HYDROCHLORIDE 4 ML: 2.5 INJECTION, SOLUTION INFILTRATION; PERINEURAL at 11:15

## 2023-11-03 NOTE — PROGRESS NOTES
Assessment/Plan:  Assessment/Plan   Diagnoses and all orders for this visit:    Radiculopathy, lumbar region  -     Ambulatory referral to Spine & Pain Management; Future    Lumbar facet arthropathy  -     Ambulatory referral to Spine & Pain Management; Future    Lumbar foraminal stenosis  -     Ambulatory referral to Spine & Pain Management; Future    Tendinitis of right hip  -     Large joint arthrocentesis: R greater trochanteric bursa    Tear of right acetabular labrum, subsequent encounter    Other orders  -     Ozempic, 1 MG/DOSE, 4 MG/3ML injection pen; INJECT 1 MG UNDER THE SKIN EVERY 7 DAYS  -     HYDROcodone-acetaminophen (NORCO) 5-325 mg per tablet;  (Patient not taking: Reported on 11/3/2023)        55-year-old male with low back pain and right lower extremity pain and numbness of more than 1 year duration. Discussed with patient MRI results, impression, and plan. MRI lumbar spine noted for L4-L5 moderate to severe facet hypertrophy with mild annular bulge, L5-S1 degenerative disc osteophyte complex exited the right resulting in moderate right foraminal narrowing. MRI right hip is unremarkable for static nerve pathology, with minimal right gluteus medius peritendinitis and tiny right anterior superior labral tear. Clinical impression is that he may have symptoms from combination condition of lumbar spine pathology degenerative changes causing nerve irritation and radicular symptoms and from localized hip tendinopathy. I discussed steroid injection which would be diagnostic and therapeutic. I administered mixture 3 cc 0.25% bupivacaine and 1 cc Kenalog to right greater trochanter without complication. I will refer him to pain management for further evaluation and recommendation of treatment. I advised that surgery is not warranted at this time. He will follow-up with me as needed. Subjective:   Patient ID: Dariana Latham is a 37 y.o. male.   Chief Complaint   Patient presents with    Lower Back - Follow-up    Right Hip - Follow-up        66-year-old male following up for low back pain and right lower extremity pain and numbness of more than 1 year duration. He was last seen by me 3 weeks ago at which point he was referred for MRIs of lumbar spine and right hip. He denies changes in symptoms since his last visit. He has pain described localized to the right buttock and lateral hip, radiating distally along the lateral aspect of the thigh and to the lower leg, worse with prolonged standing and ambulation, associated with numbness and tingling in the right lower extremity, and improved with resting. Hip Pain  This is a chronic problem. The current episode started more than 1 year ago. The problem occurs daily. The problem has been waxing and waning. Associated symptoms include arthralgias and numbness. Pertinent negatives include no joint swelling or weakness. The symptoms are aggravated by standing and walking. He has tried rest, position changes and NSAIDs (Physical therapy, chiropractic, home exercise) for the symptoms. The treatment provided mild relief. Review of Systems   Musculoskeletal:  Positive for arthralgias. Negative for joint swelling. Neurological:  Positive for numbness. Negative for weakness. Objective:  Vitals:    11/03/23 1106   BP: 130/79   Pulse: 86   Weight: 135 kg (298 lb)   Height: 5' 7" (1.702 m)      Right Hip Exam     Tenderness   The patient is experiencing tenderness in the greater trochanter. Physical Exam  Vitals and nursing note reviewed. Constitutional:       Appearance: Normal appearance. He is well-developed. He is not ill-appearing or diaphoretic. HENT:      Head: Normocephalic and atraumatic. Right Ear: External ear normal.      Left Ear: External ear normal.   Eyes:      Conjunctiva/sclera: Conjunctivae normal.   Neck:      Trachea: No tracheal deviation. Cardiovascular:      Rate and Rhythm: Normal rate.    Pulmonary: Effort: Pulmonary effort is normal. No respiratory distress. Abdominal:      General: There is no distension. Musculoskeletal:         General: Tenderness present. Skin:     General: Skin is warm and dry. Coloration: Skin is not jaundiced or pale. Neurological:      Mental Status: He is alert and oriented to person, place, and time. Psychiatric:         Mood and Affect: Mood normal.         Behavior: Behavior normal.         Thought Content: Thought content normal.         Judgment: Judgment normal.         I have personally reviewed pertinent films in PACS and my interpretation is  . Lumbar spine multiple discs bulging and facet hypertrophy. No appreciable stress fracture of the hip. Large joint arthrocentesis: R greater trochanteric bursa  Universal Protocol:  Consent: Verbal consent obtained. Risks and benefits: risks, benefits and alternatives were discussed  Consent given by: patient  Time out: Immediately prior to procedure a "time out" was called to verify the correct patient, procedure, equipment, support staff and site/side marked as required. Patient understanding: patient states understanding of the procedure being performed  Patient consent: the patient's understanding of the procedure matches consent given  Procedure consent: procedure consent matches procedure scheduled  Relevant documents: relevant documents present and verified  Test results: test results available and properly labeled  Site marked: the operative site was marked  Radiology Images displayed and confirmed.  If images not available, report reviewed: imaging studies available  Required items: required blood products, implants, devices, and special equipment available  Patient identity confirmed: verbally with patient  Supporting Documentation  Indications: pain   Procedure Details  Location: hip - R greater trochanteric bursa  Preparation: Patient was prepped and draped in the usual sterile fashion  Needle gauge: 3.5" 22G.   Approach: lateral  Medications administered: 4 mL bupivacaine 0.25 %; 1 mL bupivacaine 0.25 %; 40 mg triamcinolone acetonide 40 mg/mL    Patient tolerance: patient tolerated the procedure well with no immediate complications  Dressing:  Sterile dressing applied

## 2023-11-29 ENCOUNTER — OFFICE VISIT (OUTPATIENT)
Dept: URGENT CARE | Facility: CLINIC | Age: 43
End: 2023-11-29
Payer: COMMERCIAL

## 2023-11-29 VITALS
DIASTOLIC BLOOD PRESSURE: 68 MMHG | RESPIRATION RATE: 22 BRPM | OXYGEN SATURATION: 96 % | HEART RATE: 86 BPM | TEMPERATURE: 97.8 F | SYSTOLIC BLOOD PRESSURE: 116 MMHG

## 2023-11-29 DIAGNOSIS — J06.9 ACUTE URI: Primary | ICD-10-CM

## 2023-11-29 PROCEDURE — 99213 OFFICE O/P EST LOW 20 MIN: CPT | Performed by: PHYSICIAN ASSISTANT

## 2023-11-29 RX ORDER — AMOXICILLIN 500 MG/1
500 CAPSULE ORAL EVERY 12 HOURS SCHEDULED
Qty: 14 CAPSULE | Refills: 0 | Status: SHIPPED | OUTPATIENT
Start: 2023-11-29 | End: 2023-12-06

## 2023-11-29 NOTE — PROGRESS NOTES
[facility]        NAME: Paul Hernandes is a 37 y.o. male  : 1980    MRN: [id number]  DATE: 2023  TIME: 2:25 PM    Assessment and Plan   Acute URI [J06.9]  1. Acute URI  amoxicillin (AMOXIL) 500 mg capsule        Will try antibiotic given duration and worsening     Patient Instructions       Follow up with PCP in 3-5 days. Proceed to  ER if symptoms worsen. Chief Complaint     Chief Complaint   Patient presents with   • Sore Throat     Pt c/o of sore throat, cough and congestion for about a week worse in past 2 days. Mucinex, sudafed, robutussin for diabetics and Nyquil. Some improvement         History of Present Illness       Patient is a 36 yo male who presents for evaluation of cough, congestion, and sore throat x 1 week, worsening over the last two days. Has taken Mucinex, Dayquil, Robitussin, and Sudafed. No fevers. Review of Systems   Review of Systems   Constitutional:  Negative for fever. HENT:  Positive for congestion and sore throat. Respiratory:  Positive for cough.           Current Medications       Current Outpatient Medications:   •  amoxicillin (AMOXIL) 500 mg capsule, Take 1 capsule (500 mg total) by mouth every 12 (twelve) hours for 7 days, Disp: 14 capsule, Rfl: 0  •  buPROPion (WELLBUTRIN XL) 300 mg 24 hr tablet, TAKE 1 TABLET (300 MG TOTAL) BY MOUTH EVERY MORNING., Disp: , Rfl:   •  lisinopril (ZESTRIL) 10 mg tablet, Take 10 mg by mouth daily, Disp: , Rfl:   •  metFORMIN (GLUCOPHAGE) 500 mg tablet, Every 12 hours, Disp: , Rfl:   •  Ozempic, 1 MG/DOSE, 4 MG/3ML injection pen, INJECT 1 MG UNDER THE SKIN EVERY 7 DAYS, Disp: , Rfl:   •  HYDROcodone-acetaminophen (NORCO) 5-325 mg per tablet, , Disp: , Rfl:   •  semaglutide, 0.25 or 0.5 mg/dose, (Ozempic) 2 mg/1.5 mL injection pen, Inject 0.25 mg under the skin Once a week (Patient not taking: Reported on 11/3/2023), Disp: , Rfl:     Current Allergies     Allergies as of 2023 - Reviewed 2023 Allergen Reaction Noted   • Azithromycin Other (See Comments) 07/18/2017   • Canagliflozin Hives 11/03/2023            The following portions of the patient's history were reviewed and updated as appropriate: allergies, current medications, past family history, past medical history, past social history, past surgical history and problem list.     Past Medical History:   Diagnosis Date   • Diabetes mellitus ([address])    • Hypertension        Past Surgical History:   Procedure Laterality Date   • FOREARM SURGERY Right 1985       Family History   Problem Relation Age of Onset   • Heart disease Mother    • Diabetes Mother    • Heart block Mother    • Dementia Mother    • Heart disease Father    • Diabetes Father    • Cancer Sister          Medications have been verified. Objective   /68   Pulse 86   Temp 97.8 °F (36.6 °C) (Tympanic)   Resp 22   SpO2 96%        Physical Exam     Physical Exam  Constitutional:       General: He is not in acute distress. Appearance: Normal appearance. He is not ill-appearing or diaphoretic. HENT:      Right Ear: Tympanic membrane, ear canal and external ear normal.      Left Ear: Tympanic membrane, ear canal and external ear normal.      Nose: Nose normal.      Mouth/Throat:      Mouth: Mucous membranes are moist.      Pharynx: Oropharynx is clear. No posterior oropharyngeal erythema. Eyes:      Conjunctiva/sclera: Conjunctivae normal.   Cardiovascular:      Rate and Rhythm: Normal rate and regular rhythm. Heart sounds: Normal heart sounds. Pulmonary:      Effort: Pulmonary effort is normal.      Breath sounds: Normal breath sounds. Skin:     General: Skin is warm and dry. Neurological:      Mental Status: He is alert.    Psychiatric:         Mood and Affect: Mood normal.         Behavior: Behavior normal.

## 2024-04-26 ENCOUNTER — TELEPHONE (OUTPATIENT)
Age: 44
End: 2024-04-26

## 2024-04-26 NOTE — TELEPHONE ENCOUNTER
Patient called asking to speak to [facility].  Provided number and cold transfer to [facility].